# Patient Record
Sex: FEMALE | Race: WHITE | Employment: FULL TIME | URBAN - METROPOLITAN AREA
[De-identification: names, ages, dates, MRNs, and addresses within clinical notes are randomized per-mention and may not be internally consistent; named-entity substitution may affect disease eponyms.]

---

## 2017-03-06 ENCOUNTER — OFFICE VISIT (OUTPATIENT)
Dept: SURGERY | Age: 42
End: 2017-03-06

## 2017-03-06 VITALS
BODY MASS INDEX: 24.32 KG/M2 | HEART RATE: 63 BPM | HEIGHT: 65 IN | DIASTOLIC BLOOD PRESSURE: 37 MMHG | SYSTOLIC BLOOD PRESSURE: 106 MMHG | WEIGHT: 146 LBS

## 2017-03-06 DIAGNOSIS — C50.911 MALIGNANT NEOPLASM OF RIGHT FEMALE BREAST, UNSPECIFIED SITE OF BREAST: Primary | ICD-10-CM

## 2017-03-06 DIAGNOSIS — Z90.13 S/P BILATERAL MASTECTOMY: ICD-10-CM

## 2017-03-06 NOTE — PROGRESS NOTES
HISTORY OF PRESENT ILLNESS  Cary Mathews is a 42 y.o. female.   HPI       ROS    Physical Exam    ASSESSMENT and PLAN  {ASSESSMENT/PLAN:31327}

## 2017-03-06 NOTE — PROGRESS NOTES
HISTORY OF PRESENT ILLNESS  Jazmin Siegel is a 42 y.o. female. HPI  ESTABLISHED patient here today for follow up RIGHT breast cancer. Denies any breast problems at this time. Currently taking Tamoxifen and tolerating well.      History of breast cancer-  RIGHT breast ILC - ER positive, KS positive, Her2 negative. 16- BILATERAL mastectomy, RIGHT SLNB with reconstruction by Dr. Fernanda Mckenzie  Low risk mammaprint. No chemotherapy or radiation  Started taking Tamoxifen. Followed by Dr. Lorel Opitz at [facility]  16- final breast reconstruction with Dr. Fernanda Mckenzie  10/24/2016- revision reconstruction with fat transfer surgery with Dr. Fernanda Mckenzie. 2016 - BSO    Past Surgical History:   Procedure Laterality Date    HX APPENDECTOMY      HX BREAST BIOPSY  11/6/15    RIGHT BREAST BIOPSY, CA FOUND.  HX BREAST RECONSTRUCTION Bilateral 2016    BREAST RECONSTRUCTION performed by Anthony Cage MD at [address] [place] [address] HX BREAST RECONSTRUCTION Bilateral 2016    REMOVAL BILATERAL BREAST TISSUE EXPANDERS, PLACEMENT SILCONE BREAST IMPLANTS performed by Anthony Cage MD at [facility] OR    HX BREAST RECONSTRUCTION Bilateral 10/24/2016    REVISION BILATERAL RECONSTRUCTED BREAST WITH FAT GRAFTING performed by Anthony Cage MD at [facility]    HX  SECTION  2004, 5/2007    x 2    HX MASTECTOMY Bilateral        FH includes-  Paternal grandmother had both breast and colon cancer, survivor. Maternal aunt is a survivor of breast cancer at age 61. Patient has had genetic testing- VUS of ELLYN  ROS    Physical Exam   Constitutional: She is oriented to person, place, and time. She appears well-developed and well-nourished. Pulmonary/Chest: Right breast exhibits no mass, no skin change and no tenderness. Left breast exhibits no mass, no skin change and no tenderness. Musculoskeletal: Normal range of motion. UE x 2   Lymphadenopathy:     She has no cervical adenopathy.      She has no axillary adenopathy. Right: No supraclavicular adenopathy present. Left: No supraclavicular adenopathy present. Neurological: She is alert and oriented to person, place, and time. Psychiatric: She has a normal mood and affect. Her speech is normal and behavior is normal.     Visit Vitals    BP (!) 106/37    Pulse 63    Ht 5' 5\" (1.651 m)    Wt 146 lb (66.2 kg)    BMI 24.3 kg/m2     ASSESSMENT and PLAN  Encounter Diagnoses   Name Primary?  Malignant neoplasm of right female breast, unspecified site of breast ([facility] [address] Yes    S/P bilateral mastectomy      Normal exam with no evidence of local recurrence. We discussed her diagnosis, treatment and ongoing management of her breast health with SBE, CBE and imaging PRN. Patient had liposuction to her side and fat transfer in 10/2016. She reports it was extremely painful and she does not plan to do it again. She also had a BSO in 12/2016. She will continue on the tamoxifen and will plan to RTC here in 6 months. She is comfortable with this plan. All questions answered and she stated understanding.

## 2017-03-06 NOTE — PATIENT INSTRUCTIONS
Breast Self-Exam: Care Instructions  Your Care Instructions  A breast self-exam is when you check your breasts for lumps or changes. This regular exam helps you learn how your breasts normally look and feel. Most breast problems or changes are not because of cancer. Breast self-exam is not a substitute for a mammogram. Having regular breast exams by your doctor and regular mammograms improve your chances of finding any problems with your breasts. Some women set a time each month to do a step-by-step breast self-exam. Other women like a less formal system. They might look at their breasts as they brush their teeth, or feel their breasts once in a while in the shower. If you notice a change in your breast, tell your doctor. Follow-up care is a key part of your treatment and safety. Be sure to make and go to all appointments, and call your doctor if you are having problems. Its also a good idea to know your test results and keep a list of the medicines you take. How do you do a breast self-exam?  · The best time to examine your breasts is usually one week after your menstrual period begins. Your breasts should not be tender then. If you do not have periods, you might do your exam on a day of the month that is easy to remember. · To examine your breasts:  ¨ Remove all your clothes above the waist and lie down. When you are lying down, your breast tissue spreads evenly over your chest wall, which makes it easier to feel all your breast tissue. ¨ Use the pads--not the fingertips--of the 3 middle fingers of your left hand to check your right breast. Move your fingers slowly in small coin-sized circles that overlap. ¨ Use three levels of pressure to feel of all your breast tissue. Use light pressure to feel the tissue close to the skin surface. Use medium pressure to feel a little deeper. Use firm pressure to feel your tissue close to your breastbone and ribs.  Use each pressure level to feel your breast tissue before moving on to the next spot. ¨ Check your entire breast, moving up and down as if following a strip from the collarbone to the bra line, and from the armpit to the ribs. Repeat until you have covered the entire breast.  ¨ Repeat this procedure for your left breast, using the pads of the 3 middle fingers of your right hand. · To examine your breasts while in the shower:  ¨ Place one arm over your head and lightly soap your breast on that side. ¨ Using the pads of your fingers, gently move your hand over your breast (in the strip pattern described above), feeling carefully for any lumps or changes. ¨ Repeat for the other breast.  · Have your doctor inspect anything you notice to see if you need further testing. Where can you learn more? Go to http://elli-quincy.info/. Enter P148 in the search box to learn more about \"Breast Self-Exam: Care Instructions. \"  Current as of: July 26, 2016  Content Version: 11.1  © 4484-2943 [employer]. Care instructions adapted under license by [employer]bridge (which disclaims liability or warranty for this information). If you have questions about a medical condition or this instruction, always ask your healthcare professional. [place] [address] any warranty or liability for your use of this information.

## 2017-09-14 ENCOUNTER — OFFICE VISIT (OUTPATIENT)
Dept: SURGERY | Age: 42
End: 2017-09-14

## 2017-09-14 VITALS
WEIGHT: 150 LBS | DIASTOLIC BLOOD PRESSURE: 41 MMHG | SYSTOLIC BLOOD PRESSURE: 97 MMHG | HEIGHT: 65 IN | BODY MASS INDEX: 24.99 KG/M2 | HEART RATE: 75 BPM

## 2017-09-14 DIAGNOSIS — C50.911 MALIGNANT NEOPLASM OF RIGHT FEMALE BREAST, UNSPECIFIED SITE OF BREAST: Primary | ICD-10-CM

## 2017-09-14 DIAGNOSIS — Z90.13 S/P BILATERAL MASTECTOMY: ICD-10-CM

## 2017-09-14 RX ORDER — IPRATROPIUM BROMIDE 42 UG/1
SPRAY, METERED NASAL
Refills: 5 | COMMUNITY
Start: 2017-09-07

## 2017-09-14 RX ORDER — TRIAMCINOLONE ACETONIDE 55 UG/1
SPRAY, METERED NASAL
Refills: 5 | COMMUNITY
Start: 2017-09-08 | End: 2018-03-15

## 2017-09-14 NOTE — PROGRESS NOTES
HISTORY OF PRESENT ILLNESS  Trinity Sanchez is a 36 y.o. female. Breast Cancer     ESTABLISHED patient here today for follow up RIGHT breast cancer. Denies any breast problems at this time. Currently taking Tamoxifen and tolerating well.       History of breast cancer-  RIGHT breast ILC - ER positive, CT positive, Her2 negative. 16- BILATERAL mastectomy, RIGHT SLNB with reconstruction by Dr. Mi Diaz  Low risk mammaprint. No chemotherapy or radiation  Started taking Tamoxifen. Follow[facility] by Dr. Lion Olmedo at [facility]  16- final breast reconstruction with Dr. Mi Diaz  10/24/2016- revision reconstruction with fat transfer surgery with Dr. Mi Diaz. 2016 - bilateral fallopian tubes removed; ovaries and uterus remain    OB History      Para Term  AB Living    3 2   1 2    SAB TAB Ectopic Molar Multiple Live Births                 Obstetric Comments    Menarche:  5. LMP: 10/27/2015. # of Children:  2. Age at Delivery of First Child:  26.   Hysterectomy/oophorectomy:  NO/NO. Breast Bx:  no.  Hx of Breast Feeding:  yes. BCP:  Yes '93 to 03. Hormone therapy:  no.         Past Surgical History:   Procedure Laterality Date    HX APPENDECTOMY      HX BREAST BIOPSY  11/6/15    RIGHT BREAST BIOPSY, CA FOUND.  HX BREAST RECONSTRUCTION Bilateral 2016    BREAST RECONSTRUCTION performed by Xiomara Yarbrough MD at [address] [place] [address] HX BREAST RECONSTRUCTION Bilateral 2016    REMOVAL BILATERAL BREAST TISSUE EXPANDERS, PLACEMENT SILCONE BREAST IMPLANTS performed by Xiomara Yarbrough MD at [facility]    HX BREAST RECONSTRUCTION Bilateral 10/24/2016    REVISION BILATERAL RECONSTRUCTED BREAST WITH FAT GRAFTING performed by Xiomara Yarbrough MD at [facility]    HX  SECTION  2004, 5/2007    x 2    HX MASTECTOMY Bilateral      FH includes-  Paternal grandmother had both breast and colon cancer, survivor. Maternal aunt is a survivor of breast cancer at age 61.   Patient has had genetic testing- VUS of ELLYN    ROS    Physical Exam   Constitutional: She appears well-developed and well-nourished. Pulmonary/Chest:       Musculoskeletal: Normal range of motion. UE x 2   Lymphadenopathy:     She has no cervical adenopathy. She has no axillary adenopathy. Right: No supraclavicular adenopathy present. Left: No supraclavicular adenopathy present. Skin: Skin is warm, dry and intact. Chest and breasts examined   Psychiatric: She has a normal mood and affect. Her speech is normal and behavior is normal.     Visit Vitals    BP 97/41    Pulse 75    Ht 5' 5\" (1.651 m)    Wt 150 lb (68 kg)    BMI 24.96 kg/m2     ASSESSMENT and PLAN  Encounter Diagnoses   Name Primary?  Malignant neoplasm of right female breast, unspecified site of breast ([facility] [address] Yes    S/P bilateral mastectomy      Normal exam in this patient with a history of right breast cancer s/p bilateral mastectomy. She is feeling well and continues on her tamoxifen. She will plan to RTC here in 6 months and then we can likely go to yearly visits. She is comfortable with this plan. All questions answered and she stated understanding.

## 2017-09-14 NOTE — MR AVS SNAPSHOT
Visit Information Date & Time Provider Department Dept. Phone Encounter #  
 9/14/2017  9:00 AM Talia Torres  [address] [id number] Your Appointments 3/22/2018  8:30 AM  
Follow Up with Talia Torres NP [address] ([address]) Appt Note: 6 month follow up Cp$50 9/14/17 tt  
 [address] [address]  
[address] Upcoming Health Maintenance Date Due Pneumococcal 19-64 Highest Risk (1 of 3 - PCV13) 4/1/1993 DTaP/Tdap/Td series (1 - Tdap) 4/1/1995 PAP AKA CERVICAL CYTOLOGY 4/1/1995 INFLUENZA AGE 9 TO ADULT 8/1/2017 Allergies as of 9/14/2017  Review Complete On: 9/14/2017 By: Judith Victoria RN No Known Allergies Current Immunizations  Never Reviewed No immunizations on file. Not reviewed this visit Vitals BP Pulse Height(growth percentile) Weight(growth percentile) BMI OB Status 97/41 75 5' 5\" (1.651 m) 150 lb (68 kg) 24.96 kg/m2 Having regular periods Smoking Status Never Smoker BMI and BSA Data Body Mass Index Body Surface Area 24.96 kg/m 2 1.77 m 2 Preferred Pharmacy Pharmacy Name Phone [facility] [address] [place] [facility] [place] - [address] , [place] [address] [phone] Your Updated Medication List  
  
   
This list is accurate as of: 9/14/17  9:24 AM.  Always use your most recent med list.  
  
  
  
  
 ipratropium 0.06 % nasal spray Commonly known as:  ATROVENT  
USE 2 SPRAYS IN [facility] SIDE OF NOSE AT BEDTIME  
  
 tamoxifen 20 mg tablet Commonly known as:  NOLVADEX Take 20 mg by mouth daily. triamcinolone 55 mcg nasal inhaler Commonly known as:  NASACORT AQ  
1-2 SPRAY(S) EACH NOSTRIL DAILY IN THE MORNING  
  
 WOMEN'S MULTIPLE VITAMINS PO Take  by mouth daily. Patient Instructions Breast Self-Exam: Care Instructions Your Care Instructions A breast self-exam is when you check your breasts for lumps or changes. This regular exam helps you learn how your breasts normally look and feel. Most breast problems or changes are not because of cancer. Breast self-exam is not a substitute for a mammogram. Having regular breast exams by your doctor and regular mammograms improve your chances of finding any problems with your breasts. Some women set a time each month to do a step-by-step breast self-exam. Other women like a less formal system. They might look at their breasts as they brush their teeth, or feel their breasts once in a while in the shower. If you notice a change in your breast, tell your doctor. Follow-up care is a key part of your treatment and safety. Be sure to make and go to all appointments, and call your doctor if you are having problems. Its also a good idea to know your test results and keep a list of the medicines you take. How do you do a breast self-exam? 
· The best time to examine your breasts is usually one week after your menstrual period begins. Your breasts should not be tender then. If you do not have periods, you might do your exam on a day of the month that is easy to remember. · To examine your breasts: ¨ Remove all your clothes above the waist and lie down. When you are lying down, your breast tissue spreads evenly over your chest wall, which makes it easier to feel all your breast tissue. ¨ Use the padsnot the fingertipsof the 3 middle fingers of your left hand to check your right breast. Move your fingers slowly in small coin-sized circles that overlap. ¨ Use three levels of pressure to feel of all your breast tissue. Use light pressure to feel the tissue close to the skin surface. Use medium pressure to feel a little deeper.  Use firm pressure to feel your tissue close to your breastbone and ribs. Use each pressure level to feel your breast tissue before moving on to the next spot. ¨ Check your entire breast, moving up and down as if following a strip from the collarbone to the bra line, and from the armpit to the ribs. Repeat until you have covered the entire breast. 
¨ Repeat this procedure for your left breast, using the pads of the 3 middle fingers of your right hand. · To examine your breasts while in the shower: 
¨ Place one arm over your head and lightly soap your breast on that side. ¨ Using the pads of your fingers, gently move your hand over your breast (in the strip pattern described above), feeling carefully for any lumps or changes. ¨ Repeat for the other breast. 
· Have your doctor inspect anything you notice to see if you need further testing. Where can you learn more? Go to http://elli-quincy.info/. Enter P148 in the search box to learn more about \"Breast Self-Exam: Care Instructions. \" Current as of: July 26, 2016 Content Version: 11.3 © 3788-6706 [employer]. Care instructions adapted under license by [employer] (which disclaims liability or warranty for this information). If you have questions about a medical condition or this instruction, always ask your healthcare professional. [address] any warranty or liability for your use of this information. Introducing [facility]! [employer] introduces [employer] patient portal. Now you can access parts of your medical record, email your doctor's office, and request medication refills online. 1. In your internet browser, go to [email or web address] 2. Click on the First Time User? Click Here link in the Sign In box. You will see the New Member Sign Up page. 3. Enter your [employer] Access Code exactly as it appears below. You will not need to use this code after youve completed the sign-up process.  If you do not sign up before the expiration date, you must request a new code. · Sosedi Access Code: [email or web address] Expires: 12/13/2017  8:44 AM 
 
4. Enter the last four digits of your Social Security Number (xxxx) and Date of Birth (mm/dd/yyyy) as indicated and click Submit. You will be taken to the next sign-up page. 5. Create a Sosedi ID. This will be your Sosedi login ID and cannot be changed, so think of one that is secure and easy to remember. 6. Create a Sosedi password. You can change your password at any time. 7. Enter your Password Reset Question and Answer. This can be used at a later time if you forget your password. 8. Enter your e-mail address. You will receive e-mail notification when new information is available in [address] 9. Click Sign Up. You can now view and download portions of your medical record. 10. Click the Download Summary menu link to download a portable copy of your medical information. If you have questions, please visit the Frequently Asked Questions section of the Sosedi website. Remember, Sosedi is NOT to be used for urgent needs. For medical emergencies, dial 911. Now available from your iPhone and Android! Please provide this summary of care documentation to your next provider. Your primary care clinician is listed as Sarah Partida. If you have any questions after today's visit, please call [phone].

## 2017-09-14 NOTE — PROGRESS NOTES
HISTORY OF PRESENT ILLNESS  Mariah Banks is a 36 y.o. female. HPI   ESTABLISHED patient here today for follow up RIGHT breast cancer. Denies any breast problems at this time. Currently taking Tamoxifen and tolerating well.       History of breast cancer-  RIGHT breast ILC - ER positive, [place] positive, Her2 negative. 1/11/16- BILATERAL mastectomy, RIGHT SLNB with reconstruction by Dr. Derrick Chong  Low risk mammaprint. No chemotherapy or radiation  Started taking Tamoxifen. Followed by Dr. Kimberly Hern[facility]dez at [facility]  4/11/16- final breast reconstruction with Dr. Derrick Chnog  10/24/2016- revision reconstruction with fat transfer surgery with Dr. Derrick Chong. 12/2016 - BSO    FH includes-  Paternal grandmother had both breast and colon cancer, survivor. Maternal aunt is a survivor of breast cancer at age 61.   Patient has had genetic testing- VUS of ELLYN    ROS    Physical Exam    ASSESSMENT and PLAN  {ASSESSMENT/PLAN:72631}

## 2017-09-14 NOTE — PATIENT INSTRUCTIONS
Breast Self-Exam: Care Instructions  Your Care Instructions  A breast self-exam is when you check your breasts for lumps or changes. This regular exam helps you learn how your breasts normally look and feel. Most breast problems or changes are not because of cancer. Breast self-exam is not a substitute for a mammogram. Having regular breast exams by your doctor and regular mammograms improve your chances of finding any problems with your breasts. Some women set a time each month to do a step-by-step breast self-exam. Other women like a less formal system. They might look at their breasts as they brush their teeth, or feel their breasts once in a while in the shower. If you notice a change in your breast, tell your doctor. Follow-up care is a key part of your treatment and safety. Be sure to make and go to all appointments, and call your doctor if you are having problems. Its also a good idea to know your test results and keep a list of the medicines you take. How do you do a breast self-exam?  · The best time to examine your breasts is usually one week after your menstrual period begins. Your breasts should not be tender then. If you do not have periods, you might do your exam on a day of the month that is easy to remember. · To examine your breasts:  ¨ Remove all your clothes above the waist and lie down. When you are lying down, your breast tissue spreads evenly over your chest wall, which makes it easier to feel all your breast tissue. ¨ Use the pads--not the fingertips--of the 3 middle fingers of your left hand to check your right breast. Move your fingers slowly in small coin-sized circles that overlap. ¨ Use three levels of pressure to feel of all your breast tissue. Use light pressure to feel the tissue close to the skin surface. Use medium pressure to feel a little deeper. Use firm pressure to feel your tissue close to your breastbone and ribs.  Use each pressure level to feel your breast tissue before moving on to the next spot. ¨ Check your entire breast, moving up and down as if following a strip from the collarbone to the bra line, and from the armpit to the ribs. Repeat until you have covered the entire breast.  ¨ Repeat this procedure for your left breast, using the pads of the 3 middle fingers of your right hand. · To examine your breasts while in the shower:  ¨ Place one arm over your head and lightly soap your breast on that side. ¨ Using the pads of your fingers, gently move your hand over your breast (in the strip pattern described above), feeling carefully for any lumps or changes. ¨ Repeat for the other breast.  · Have your doctor inspect anything you notice to see if you need further testing. Where can you learn more? Go to http://elli-quincy.info/. Enter P148 in the search box to learn more about \"Breast Self-Exam: Care Instructions. \"  Current as of: July 26, 2016  Content Version: 11.3  © 9614-2344 [employer]. Care instructions adapted under license by [employer] (which disclaims liability or warranty for this information). If you have questions about a medical condition or this instruction, always ask your healthcare professional. [place] [address] any warranty or liability for your use of this information.

## 2018-02-28 ENCOUNTER — HOSPITAL ENCOUNTER (OUTPATIENT)
Dept: ULTRASOUND IMAGING | Age: 43
Discharge: HOME | End: 2018-02-28
Attending: FAMILY MEDICINE
Payer: COMMERCIAL

## 2018-02-28 DIAGNOSIS — M79.661 RIGHT CALF PAIN: ICD-10-CM

## 2018-02-28 PROCEDURE — 93971 EXTREMITY STUDY: CPT

## 2018-03-15 ENCOUNTER — OFFICE VISIT (OUTPATIENT)
Dept: SURGERY | Age: 43
End: 2018-03-15

## 2018-03-15 VITALS
HEIGHT: 65 IN | BODY MASS INDEX: 25.99 KG/M2 | DIASTOLIC BLOOD PRESSURE: 64 MMHG | HEART RATE: 77 BPM | WEIGHT: 156 LBS | SYSTOLIC BLOOD PRESSURE: 125 MMHG

## 2018-03-15 DIAGNOSIS — N63.0 BREAST MASS IN FEMALE: Primary | ICD-10-CM

## 2018-03-15 DIAGNOSIS — Z85.3 HISTORY OF BREAST CANCER IN FEMALE: ICD-10-CM

## 2018-03-15 DIAGNOSIS — Z90.13 S/P BILATERAL MASTECTOMY: ICD-10-CM

## 2018-03-15 NOTE — PROGRESS NOTES
HISTORY OF PRESENT ILLNESS  Juancho Brandon is a 36 y.o. female. HPI   ESTABLISHED patient here today for follow up RIGHT breast cancer. She palpated LEFT breast lump at 12 o'clock 2 weeks ago. Denies any changes in size, pain, or redness. Currently taking Tamoxifen and tolerating well.       History of breast cancer-  RIGHT breast ILC - ER positive, [place] positive, Her2 negative. 1/11/16- BILATERAL mastectomy, RIGHT SLNB with reconstruction by Dr. Lilliam Sahu  Low risk mammaprint. No chemotherapy or radiation  Started taking Tamoxifen. [facility] Lilibeth Yan at [facility]  4/11/16- final breast reconstruction with Dr. Lilliam Sahu  10/24/2016- revision reconstruction with fat transfer surgery with Dr. Lilliam Sahu. 12/2016 - bilateral fallopian tubes removed; ovaries and uterus remain    FH includes-  Paternal grandmother had both breast and colon cancer, survivor. Maternal aunt is a survivor of breast cancer at age 61.   Patient has had genetic testing- VUS of ELLYN    ROS    Physical Exam    ASSESSMENT and PLAN  {ASSESSMENT/PLAN:59394}

## 2018-03-15 NOTE — PATIENT INSTRUCTIONS
Breast Self-Exam: Care Instructions  Your Care Instructions    A breast self-exam is when you check your breasts for lumps or changes. This regular exam helps you learn how your breasts normally look and feel. Most breast problems or changes are not because of cancer. Breast self-exam is not a substitute for a mammogram. Having regular breast exams by your doctor and regular mammograms improve your chances of finding any problems with your breasts. Some women set a time each month to do a step-by-step breast self-exam. Other women like a less formal system. They might look at their breasts as they brush their teeth, or feel their breasts once in a while in the shower. If you notice a change in your breast, tell your doctor. Follow-up care is a key part of your treatment and safety. Be sure to make and go to all appointments, and call your doctor if you are having problems. It's also a good idea to know your test results and keep a list of the medicines you take. How do you do a breast self-exam?  · The best time to examine your breasts is usually one week after your menstrual period begins. Your breasts should not be tender then. If you do not have periods, you might do your exam on a day of the month that is easy to remember. · To examine your breasts:  ¨ Remove all your clothes above the waist and lie down. When you are lying down, your breast tissue spreads evenly over your chest wall, which makes it easier to feel all your breast tissue. ¨ Use the pads-not the fingertips-of the 3 middle fingers of your left hand to check your right breast. Move your fingers slowly in small coin-sized circles that overlap. ¨ Use three levels of pressure to feel of all your breast tissue. Use light pressure to feel the tissue close to the skin surface. Use medium pressure to feel a little deeper. Use firm pressure to feel your tissue close to your breastbone and ribs.  Use each pressure level to feel your breast tissue before moving on to the next spot. ¨ Check your entire breast, moving up and down as if following a strip from the collarbone to the bra line, and from the armpit to the ribs. Repeat until you have covered the entire breast.  ¨ Repeat this procedure for your left breast, using the pads of the 3 middle fingers of your right hand. · To examine your breasts while in the shower:  ¨ Place one arm over your head and lightly soap your breast on that side. ¨ Using the pads of your fingers, gently move your hand over your breast (in the strip pattern described above), feeling carefully for any lumps or changes. ¨ Repeat for the other breast.  · Have your doctor inspect anything you notice to see if you need further testing. Where can you learn more? Go to http://elli-quincy.info/. Enter P148 in the search box to learn more about \"Breast Self-Exam: Care Instructions. \"  Current as of: May 12, 2017  Content Version: 11.4  © 9414-0183 [employer]. Care instructions adapted under license by [employer] (which disclaims liability or warranty for this information). If you have questions about a medical condition or this instruction, always ask your healthcare professional. [place] [address] any warranty or liability for your use of this information.

## 2018-03-15 NOTE — Clinical Note
Thanks for looking at the [address]. I told her likely scenarios were you thought it was benign and nothing needed to be done right now, she would need a visit with you for additional US/biopsy or an MRI. She was very understanding. Thank you!

## 2018-03-16 ENCOUNTER — TELEPHONE (OUTPATIENT)
Dept: SURGERY | Age: 43
End: 2018-03-16

## 2018-03-16 NOTE — PROGRESS NOTES
HISTORY OF PRESENT ILLNESS  Rebecca Hauser is a 36 y.o. female. Breast Cancer     Breast Mass     ESTABLISHED patient here today for follow up RIGHT breast cancer. She palpated LEFT breast lump at 12 o'clock 2 weeks ago. Denies any changes in size, pain, or redness. Currently taking Tamoxifen and tolerating well.       History of breast cancer-  RIGHT breast ILC - ER positive, KS positive, Her2 negative. 16- BILATERAL mastectomy, RIGHT SLNB with reconstruction by Dr. Catrina Gomes  Low risk mammaprint. No chemotherapy or radiation  Started taking Tamoxifen. Followed by Dr. Francois Callahan at [facility]  16- final breast reconstruction with Dr. Catrina Gomes  10/24/2016- revision reconstruction with fat transfer surgery with Dr. Catrina Gomes. 2016 - bilateral fallopian tubes removed; ovaries and uterus remain    OB History      Para Term  AB Living    3 2   1 2    SAB TAB Ectopic Molar Multiple Live Births                 Obstetric Comments    Menarche:  5. LMP: 10/27/2015. # of Children:  2. Age at Delivery of First Child:  26.   Hysterectomy/oophorectomy:  NO/NO. Breast Bx:  no.  Hx of Breast Feeding:  yes. BCP:  Yes '93 to '03. Hormone therapy:  no.         Past Surgical History:   Procedure Laterality Date    HX APPENDECTOMY      HX BREAST BIOPSY  11/6/15    RIGHT BREAST BIOPSY, CA FOUND.     HX BREAST RECONSTRUCTION Bilateral 2016    BREAST RECONSTRUCTION performed by Thea Hernandez MD at [address] [place] [address] HX BREAST RECONSTRUCTION Bilateral 2016    REMOVAL BILATERAL BREAST TISSUE EXPANDERS, PLACEMENT SILCONE BREAST IMPLANTS performed by Thea Hernandez MD at [facility] AMBUL[facility] OR    HX BREAST RECONSTRUCTION Bilateral 10/24/2016    REVISION BILATERAL RECONSTRUCTED BREAST WITH FAT GRAFTING performed by Thea Hernandez MD at [facility]UL[facility] OR    HX  SECTION  2004, 5/2007    x 2    HX MASTECTOMY Bilateral      FH includes-  Paternal grandmother had both breast and colon cancer, survivor. Maternal aunt is a survivor of breast cancer at age 61. Patient has had genetic testing- VUS of ELLYN    ROS    Physical Exam   Constitutional: She appears well-developed and well-nourished. Pulmonary/Chest:       Musculoskeletal: Normal range of motion. UE x 2   Lymphadenopathy:     She has no axillary adenopathy. Psychiatric: She has a normal mood and affect. Her speech is normal and behavior is normal.     BREAST ULTRASOUND  Indication: left breast mass at 12:00 on reconstructed breast  Technique: The area was scanned using a high-frequency linear-array near-field transducer  Findings: Well circumscribed hypoechoic area  Impression: Palpable mass with ultrasound correlate    Disposition: Case to be reviewed with Dr. Pearl Shepard /64    Pulse 77    Ht 5' 5\" (1.651 m)    Wt 156 lb (70.8 kg)    BMI 25.96 kg/m2     ASSESSMENT and PLAN  Encounter Diagnoses   Name Primary?  Breast mass in female Yes    History of breast cancer in female     S/P bilateral mastectomy      Left reconstructed breast with palpable mass at 12:00. Likely benign process (fat necrosis or cyst discussed), but case to be reviewed by Dr. Hayley Jose. Discussed unlikely a local recurrence of breast cancer as her cancer was in her right breast and unlikely new primary as well. Will follow-up with patient tomorrow. She is comfortable with this plan. All questions answered and she stated understanding.

## 2018-03-19 ENCOUNTER — OFFICE VISIT (OUTPATIENT)
Dept: SURGERY | Age: 43
End: 2018-03-19

## 2018-03-19 VITALS
DIASTOLIC BLOOD PRESSURE: 40 MMHG | HEIGHT: 65 IN | HEART RATE: 68 BPM | SYSTOLIC BLOOD PRESSURE: 111 MMHG | WEIGHT: 156 LBS | BODY MASS INDEX: 25.99 KG/M2

## 2018-03-19 DIAGNOSIS — C50.911 MALIGNANT NEOPLASM OF RIGHT FEMALE BREAST, UNSPECIFIED ESTROGEN RECEPTOR STATUS, UNSPECIFIED SITE OF BREAST (HCC): ICD-10-CM

## 2018-03-19 DIAGNOSIS — Z90.13 S/P BILATERAL MASTECTOMY: Primary | ICD-10-CM

## 2018-03-19 NOTE — PATIENT INSTRUCTIONS
Breast Lumps: Care Instructions  Your Care Instructions  Breast lumps are common, especially in women between ages 27 and 48. Many women's breasts feel lumpy and tender before their menstrual period. Women also may have lumps when they are breastfeeding. Breast lumps may go away after menopause. All new breast lumps in women after menopause should be checked by a doctor. Although lumps may be normal for you, it is important to have your doctor check any lump or thickness that is not like the rest of your breast to make sure it is not cancer. A lump may be larger, harder, or different from the rest of your breast tissue. Follow-up care is a key part of your treatment and safety. Be sure to make and go to all appointments, and call your doctor if you are having problems. It's also a good idea to know your test results and keep a list of the medicines you take. How can you care for yourself at home? · Make an appointment to have a mammogram and other follow-up visits as recommended by your doctor. When should you call for help? Watch closely for changes in your health, and be sure to contact your doctor if:  · You do not get better as expected. · Your breast has changed. · You have pain in your breast.  · You have a discharge from your nipple. · A breast lump changes or does not go away. Where can you learn more? Go to http://elli-quincy.info/. Enter Q848 in the search box to learn more about \"Breast Lumps: Care Instructions. \"  Current as of: October 13, 2016  Content Version: 11.4  © 4599-7910 [employer]. Care instructions adapted under license by [employer] (which disclaims liability or warranty for this information). If you have questions about a medical condition or this instruction, always ask your healthcare professional. [address] any warranty or liability for your use of this information.

## 2018-03-19 NOTE — PROGRESS NOTES
HISTORY OF PRESENT ILLNESS  Jo Mena is a 36 y.o. female. HPI ESTABLISHED patient here for assessment of lump in her LEFT breast. She palpated this about 2 weeks ago. Not having any pain.      History of breast cancer-  RIGHT breast ILC - ER positive, [place] positive, Her2 negative. 1/11/16- BILATERAL mastectomy, RIGHT SLNB with reconstruction by Dr. Marino Carranza  Low risk mammaprint. No chemotherapy or radiation  Started taking Tamoxifen. Followed by Dr. David Mendiola at [facility]  4/11/16- final breast reconstruction with Dr. Marino Carranza  10/24/2016- revision reconstruction with fat transfer surgery with Dr. Marino Carranza. 12/2016 - bilateral fallopian tubes removed; ovaries and uterus remain    FH includes-  Paternal grandmother had both breast and colon cancer, survivor. Maternal aunt is a survivor of breast cancer at age 61. Patient has had genetic testing- VUS of ELLYN    Review of Systems   All other systems reviewed and are negative. Physical Exam   Pulmonary/Chest: Right breast exhibits no inverted nipple, no mass, no nipple discharge, no skin change and no tenderness. Left breast exhibits mass (1 cm mobile mass 12:00 left mastectomy flap). Left breast exhibits no inverted nipple, no nipple discharge, no skin change and no tenderness. Breasts are symmetrical.       Lymphadenopathy:     She has no cervical adenopathy. She has no axillary adenopathy. Nursing note and vitals reviewed. BREAST ULTRASOUND  Indication: left breast mass 12:00  Technique: The area was scanned using a high-frequency linear-array near-field transducer  Findings: 1.5 x 1.0 cm mass, oval, hypoechoic, through transmission  Impression: Benign cyst  Disposition: Discussed aspiration or observation. Pt has elected for aspiration    ASPIRATION OF BREAST CYST  Indication : CYST:  left  12:00  Ultrasound Findings: see above  Prep : Alcohol. Anesthesia : Lidocaine with epinephrine, 5 cc  Guidance : Ultrasound guidance.     Yield :  2 cc clear fluid was aspirated with an 18 gauge needle. Effect : Cyst completely aspirated. Diposition:  Follow up if new mass occurs  ASSESSMENT and PLAN    ICD-10-CM ICD-9-CM    1. S/P bilateral mastectomy Z90.13 V45.71    2. Malignant neoplasm of right female breast, unspecified estrogen receptor status, unspecified site [facility] C50.911 174.9      35 yo patient new breast mass left mastectomy 12:00. Cyst on us aspirated to completion. She tolerated this well. Will see her back in 6 months.

## 2024-08-08 ENCOUNTER — HOSPITAL ENCOUNTER (OUTPATIENT)
Facility: HOSPITAL | Age: 49
Discharge: HOME | End: 2024-08-08
Attending: PLASTIC SURGERY
Payer: COMMERCIAL

## 2024-08-08 DIAGNOSIS — C50.911 MALIGNANT NEOPLASM OF RIGHT FEMALE BREAST, UNSPECIFIED ESTROGEN RECEPTOR STATUS, UNSPECIFIED SITE OF BREAST (HCC): ICD-10-CM

## 2024-08-08 PROCEDURE — 6360000004 HC RX CONTRAST MEDICATION: Performed by: PLASTIC SURGERY

## 2024-08-08 PROCEDURE — C8908 MRI W/O FOL W/CONT, BREAST,: HCPCS

## 2024-08-08 PROCEDURE — A9579 GAD-BASE MR CONTRAST NOS,1ML: HCPCS | Performed by: PLASTIC SURGERY

## 2024-08-08 RX ORDER — 0.9 % SODIUM CHLORIDE 0.9 %
100 INTRAVENOUS SOLUTION INTRAVENOUS ONCE
Status: DISCONTINUED | OUTPATIENT
Start: 2024-08-08 | End: 2024-08-12 | Stop reason: HOSPADM

## 2024-08-08 RX ADMIN — Medication 100 ML: at 09:57

## 2024-08-08 RX ADMIN — GADOTERIDOL 18 ML: 279.3 INJECTION, SOLUTION INTRAVENOUS at 09:57
